# Patient Record
Sex: FEMALE | URBAN - METROPOLITAN AREA
[De-identification: names, ages, dates, MRNs, and addresses within clinical notes are randomized per-mention and may not be internally consistent; named-entity substitution may affect disease eponyms.]

---

## 2023-08-25 ENCOUNTER — NURSE TRIAGE (OUTPATIENT)
Dept: FAMILY MEDICINE | Facility: CLINIC | Age: 33
End: 2023-08-25

## 2023-08-25 NOTE — TELEPHONE ENCOUNTER
"Received call from scheduling. Patient's  is calling on behalf of patient, looking to schedule establish care appointment but reported reg flag symptoms so was transferred to triage.     Patient reports she has had on and off symptoms since march, \"happening often\"    Most recent symptoms started last week.   -SOB- \"it is hard to take deep breath, sometimes I have to take a few to get a good breath\"  -Heart beating fast   -Throat feels tight at times- no changes in diet  -Sweats without working out or hot weather- denies presently  -Jaw pain- denies presently   -Denies light headedness and dizziness presently but has had in the last week    Patient reports symptoms have increased today. Patient reports present chest tightness with associated left shoulder and back pain, and left arm pain from shoulder into fingers.      Patient has not been seen for symptoms. Recently move to [place] from [place]. Patient had blood pressure check in [place] but no actual physical or assessment.     Patients denies previous cardiac problems. Thinks mother may have had some cardiac problems.     Not on birth control   Taking supplements to help with reproductive health due to PCOS    Diagnosed with obesity  Not a smoker    RN advised patient should be seen in ER now to rule out cardiac concerns. RN advised patient and patient's husbands to be seen in ER. RN advised closest hospital is recommended. Patient's  asked which hospital and RN recommended [facility] in [place] due to patient currently being near [facility] and that being a hospital near them. RN advised to be seen in ER and then schedule an appointment to establish care at a later date. Patient and patient's  verbalized understanding and all questions answered.      NURYS Saxena, RN  [facility] ~ Registered Nurse  Clinic Triage ~ [facility]  August 25, 2023          Reason for Disposition   Pain also in shoulder(s) or arm(s) or " jaw    Additional Information   Negative: SEVERE difficulty breathing (e.g., struggling for each breath, speaks in single words)   Negative: Passed out (i.e., fainted, collapsed and was not responding)   Negative: Difficult to awaken or acting confused (e.g., disoriented, slurred speech)   Negative: Shock suspected (e.g., cold/pale/clammy skin, too weak to stand, low BP, rapid pulse)   Negative: Chest pain lasting longer than 5 minutes and ANY of the following:* Over 44 years old* Over 30 years old and at least one cardiac risk factor (e.g., diabetes mellitus, high blood pressure, high cholesterol, smoker, or strong family history of heart disease)* History of heart disease (i.e., angina, heart attack, heart failure, bypass surgery, takes nitroglycerin)* Pain is crushing, pressure-like, or heavy   Negative: Heart beating < 50 beats per minute OR > 140 beats per minute   Negative: Visible sweat on face or sweat dripping down face   Negative: Sounds like a life-threatening emergency to the triager   Negative: Followed an injury to chest   Negative: SEVERE chest pain    Protocols used: Chest Pain-A-[place]